# Patient Record
Sex: FEMALE
[De-identification: names, ages, dates, MRNs, and addresses within clinical notes are randomized per-mention and may not be internally consistent; named-entity substitution may affect disease eponyms.]

---

## 2023-10-13 ENCOUNTER — HOSPITAL ENCOUNTER (OUTPATIENT)
Facility: HOSPITAL | Age: 58
Setting detail: RECURRING SERIES
End: 2023-10-13
Payer: MEDICAID

## 2023-10-16 ENCOUNTER — HOSPITAL ENCOUNTER (OUTPATIENT)
Facility: HOSPITAL | Age: 58
Setting detail: RECURRING SERIES
Discharge: HOME | End: 2023-10-19
Payer: MEDICAID

## 2023-10-16 PROCEDURE — 97161 PT EVAL LOW COMPLEX 20 MIN: CPT

## 2023-10-16 NOTE — PROGRESS NOTES
PT DAILY TREATMENT NOTE/KNEE EVAL       Patient Name: Mckayla Hein    Date: 10/16/2023    : 1965  Insurance: Payor: Gregg Thomas / Plan: Margret Paling PLUS / Product Type: *No Product type* /      Patient  verified yes     Visit #   Current / Total 1 12   Time   In / Out 8:05 8:40   Pain   In / Out 8-9/10 8-9/10   Subjective Functional Status/Changes: See below     Treatment Area: Left knee pain [M25.562]    SUBJECTIVE  Pain Level (0-10 scale):  8-9/10  []constant []intermittent []improving []worsening []no change since onset    Any medication changes, allergies to medications, adverse drug reactions, diagnosis change, or new procedure performed?: [x] No    [] Yes (see summary sheet for update)  Subjective functional status/changes:       Mechanism of Injury: pt. Reports initially having a lot of pain. She reports her biggest concern now is her knee feels like it is going to give out randomly. Reports it feels like its coming from the back of her leg when it gives out. She reports numbness in her left big toe. 23. Reports she slipped on food at grocery store. Reports she landed on her left knee then fell to the right side. Had to have an ambulance come get her. X-rays were negative for fracture. Not as painful as it was. Reports having a lot of inflammation and warmth initially and swelling has improved some. Work Hx: works a  but is off of work right now. Painful to stand and walk. Uses cane at home and community   Pt Goals: to get back to normal.     OBJECTIVE/EXAMINATION      30 min [x]Eval  - untimed                   Therapeutic Procedures: Tx Min Billable or 1:1 Min (if diff from Tx Min) Procedure, Rationale, Specifics   5  65570 Therapeutic Exercise (timed):  increase ROM, strength, coordination, balance, and proprioception to improve patient's ability to progress to PLOF and address remaining functional goals.  (see flow sheet as applicable)     Details if

## 2023-10-16 NOTE — THERAPY EVALUATION
Complexity:  History:  MEDIUM  Complexity : 1-2 comorbidities / personal factors will impact the outcome/ POC ; Examination:  MEDIUM Complexity : 3 Standardized tests and measures addressin body structure, function, activity limitation and / or participation in recreation  ;Presentation:  LOW Complexity : Stable, uncomplicated  ;Clinical Decision Making:  MEDIUM Complexity : FOTO score of 26-74 FOTO score = an established functional score where 100 = no disability  Overall Complexity Rating: LOW   Problem List: pain affecting function, decrease ROM, decrease strength, edema affection function, impaired gait/balance, decrease ADL/functional abilities, decrease activity tolerance, decrease flexibility/joint mobility, and decrease transfer abilities    Treatment Plan may include any combination of the followin Therapeutic Exercise, 84913 Neuromuscular Re-Education, 84052 Manual Therapy, 94025 Therapeutic Activity, 92830 Self Care/Home Management, 46046 Electrical Stim unattended, 68990 Electrical Stim attended, 70758 Gait Training, and 40582 Ultrasound  Patient / Family readiness to learn indicated by: asking questions  Persons(s) to be included in education: patient (P)  Barriers to Learning/Limitations: none  Measures taken if barriers to learning present: none  Patient Goal (s): to have less pain and get back to normal   Patient Self Reported Health Status: good  Rehabilitation Potential: good    Short Term Goals: To be accomplished in 1 weeks  Goal: Patient will demonstrate compliance with HEP in order to improve left knee AROM for increased ease of ADLs   Status at evaluation: n/a    Long Term Goals: To be accomplished in 6 weeks    Goal: Patient will improve FOTO score by 23 points in order to demonstrate a significant improvement in function. Status at evaluation/last progress note: 36 points    2.    Goal: Patient will improve left knee AROM flexion to 120 degrees in order to increase ease of

## 2023-11-08 ENCOUNTER — HOSPITAL ENCOUNTER (OUTPATIENT)
Facility: HOSPITAL | Age: 58
Setting detail: RECURRING SERIES
Discharge: HOME | End: 2023-11-11
Payer: MEDICAID

## 2023-11-08 PROCEDURE — 97112 NEUROMUSCULAR REEDUCATION: CPT

## 2023-11-08 PROCEDURE — 97110 THERAPEUTIC EXERCISES: CPT

## 2023-11-08 PROCEDURE — 97530 THERAPEUTIC ACTIVITIES: CPT
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
## 2023-11-10 ENCOUNTER — HOSPITAL ENCOUNTER (OUTPATIENT)
Facility: HOSPITAL | Age: 58
Setting detail: RECURRING SERIES
Discharge: HOME | End: 2023-11-13
Payer: MEDICAID

## 2023-11-10 PROCEDURE — 97112 NEUROMUSCULAR REEDUCATION: CPT

## 2023-11-10 PROCEDURE — 97110 THERAPEUTIC EXERCISES: CPT

## 2023-11-10 PROCEDURE — 97530 THERAPEUTIC ACTIVITIES: CPT

## 2023-11-10 NOTE — PROGRESS NOTES
PHYSICAL / OCCUPATIONAL THERAPY - DAILY TREATMENT NOTE (updated )    Patient Name: Leann Amin    Date: 11/10/2023    : 1965  Insurance: Payor: Anup Sox / Plan: Enrico Williamson PLUS / Product Type: *No Product type* /      Patient  verified Yes     Visit #   Current / Total 3 12   Time   In / Out 9:24 10:10   Pain   In / Out 7/10 8/10   Subjective Functional Status/Changes: Pt. Reports she is doing a little better today. She reports she did well with PT last time. She reports still not complaint with her HEP because of pain. She reports having an MRI done but doesn't have results yet. TREATMENT AREA =  Left knee pain [M25.562]    OBJECTIVE    Modalities Rationale:     decrease pain and increase tissue extensibility to improve patient's ability to progress to PLOF and address remaining functional goals. min [] Estim Unattended, type/location:                                      []  w/ice    []  w/heat    min [] Estim Attended, type/location:                                     []  w/US     []  w/ice    []  w/heat    []  TENS insruct      min []  Mechanical Traction: type/lbs                   []  pro   []  sup   []  int   []  cont    []  before manual    []  after manual    min []  Ultrasound, settings/location:     10 min  unbill []  Ice     [x]  Heat    location/position: Supine with wedge  Left knee    min []  Paraffin,  details:     min []  Vasopneumatic Device, press/temp:     min []  Gwyndolyn [place] / Anselmo Beau: If using vaso (only need to measure limb vaso being performed on)      pre-treatment girth :       post-treatment girth :       measured at (landmark location) :      min []  Other:    Skin assessment post-treatment:   Intact      Therapeutic Procedures:     Tx Min Billable or 1:1 Min (if diff from Tx Min) Procedure, Rationale, Specifics   20  35658 Therapeutic Exercise (timed):  increase ROM, strength, coordination, balance, and proprioception to improve

## 2023-11-14 ENCOUNTER — HOSPITAL ENCOUNTER (OUTPATIENT)
Facility: HOSPITAL | Age: 58
Setting detail: RECURRING SERIES
End: 2023-11-14
Payer: MEDICAID

## 2023-11-17 ENCOUNTER — HOSPITAL ENCOUNTER (OUTPATIENT)
Facility: HOSPITAL | Age: 58
Setting detail: RECURRING SERIES
Discharge: HOME | End: 2023-11-20
Payer: MEDICAID

## 2023-11-17 PROCEDURE — 97112 NEUROMUSCULAR REEDUCATION: CPT

## 2023-11-17 PROCEDURE — 97110 THERAPEUTIC EXERCISES: CPT

## 2023-11-17 PROCEDURE — 97530 THERAPEUTIC ACTIVITIES: CPT

## 2023-11-17 NOTE — THERAPY RECERTIFICATION
[id number]  [address] [place] [address], [place] Lu[place] - Ph: [phone]   Fx: [phone]  PHYSICAL THERAPY PROGRESS NOTE  [x] Progress Note  [] Discharge Summary    Patient Name: Arlene Salcedo : 1965   Treatment/Medical Diagnosis: Left knee pain [M25.562]   Referral Source: Bebo Mendieta*     Date of Initial Visit: 10/16/23 Attended Visits: 4 Missed Visits: 1       SUMMARY OF TREATMENT  Patient is making slow steady progress toward goals in therapy. Patient reports non-compliance with HEP, but reported that she goes up and down stairs multiple times a day. FOTO score decreased 3 points which indicates a slight decrease in functional ability. AROM in the left knee increased to 0-96 degrees. Patient is no longer ambulating with a cane. Left knee strength improved to 4/5 for flexion and 3+/5 for extension. Static balance is improving and patient is able to maintain balance in SLS on the left for 11 seconds. Patient continues to report moderate pain of 6/10. Patient will continue to benefit from skilled PT / OT services to modify and progress therapeutic interventions, analyze and address functional mobility deficits, analyze and address ROM deficits, analyze and address strength deficits, analyze and address soft tissue restrictions, analyze and cue for proper movement patterns, analyze and modify for postural abnormalities, analyze and address imbalance/dizziness, and instruct in home and community integration to address functional deficits and attain remaining goals. CURRENT STATUS/Progress towards Goals:  Short Term Goals: To be accomplished in 1 weeks  Goal: Patient will demonstrate compliance with HEP in order to improve left knee AROM for increased ease of ADLs   Status at evaluation: non-compliant      Long Term Goals:  To be accomplished in 6 weeks     Goal: Patient will improve FOTO score by 23 points in order to

## 2023-11-17 NOTE — PROGRESS NOTES
PHYSICAL / OCCUPATIONAL THERAPY - DAILY TREATMENT NOTE (updated )    Patient Name: Luis M Christianson    Date: 2023    : 1965  Insurance: Payor: Fareed Jimenez / Plan: MadeiraMadeirayeAmbature Nims PLUS / Product Type: *No Product type* /      Patient  verified Yes     Visit #   Current / Total 4 12   Time   In / Out 1201 1239   Pain   In / Out 6/10 5/10   Subjective Functional Status/Changes: Pt stated that she is getting better     TREATMENT AREA =  Left knee pain [M25.562]    OBJECTIVE    Therapeutic Procedures: Tx Min Billable or 1:1 Min (if diff from Tx Min) Procedure, Rationale, Specifics   10  86905 Therapeutic Exercise (timed):  increase ROM, strength, coordination, balance, and proprioception to improve patient's ability to progress to PLOF and address remaining functional goals. (see flow sheet as applicable)     Details if applicable:       10  70449 Neuromuscular Re-Education (timed):  improve balance, coordination, kinesthetic sense, posture, core stability and proprioception to improve patient's ability to develop conscious control of individual muscles and awareness of position of extremities in order to progress to PLOF and address remaining functional goals. (see flow sheet as applicable)     Details if applicable:     18  00294 Therapeutic Activity (timed):  use of dynamic activities replicating functional movements to increase ROM, strength, coordination, balance, and proprioception in order to improve patient's ability to progress to PLOF and address remaining functional goals.   (see flow sheet as applicable)     Details if applicable: FOTO and goal assess    45  [facility] Totals Reminder: bill using total billable min of TIMED therapeutic procedures (example: do not include dry needle or estim unattended, both untimed codes, in totals to left)  8-22 min = 1 unit; 23-37 min = 2 units; 38-52 min = 3 units; 53-67 min = 4 units; 68-82 min = 5 units   Total Total     [x]  Patient

## 2023-11-21 ENCOUNTER — HOSPITAL ENCOUNTER (OUTPATIENT)
Facility: HOSPITAL | Age: 58
Setting detail: RECURRING SERIES
Discharge: HOME | End: 2023-11-24
Payer: MEDICAID

## 2023-11-21 ENCOUNTER — TRANSCRIBE ORDERS (OUTPATIENT)
Facility: HOSPITAL | Age: 58
End: 2023-11-21

## 2023-11-21 DIAGNOSIS — Z12.31 SCREENING MAMMOGRAM FOR HIGH-RISK PATIENT: Primary | ICD-10-CM

## 2023-11-21 PROCEDURE — 97530 THERAPEUTIC ACTIVITIES: CPT

## 2023-11-21 PROCEDURE — 97110 THERAPEUTIC EXERCISES: CPT

## 2023-11-21 PROCEDURE — 97112 NEUROMUSCULAR REEDUCATION: CPT

## 2023-11-21 NOTE — PROGRESS NOTES
[x]  Patient Education billed concurrently with other procedures   [x] Review HEP    [] Progressed/Changed HEP, detail:    [] Other detail:       Objective Information/Functional Measures/Assessment  No difficulty with exercises  Reported feeling the toe raises in the front and back of the knee  Cont with difficulty perform SLS  No LOB with foam Romb    Pt is making slow progress toward goals. Pt cont with moderate pain in the left knee. Cont with decreased standing and walking tolerance. Cont with decreased strength and AROM in the knee. Cont to report having difficulty with performing ADLs and household tasks. Static balance is slowly improving. Patient will continue to benefit from skilled PT / OT services to modify and progress therapeutic interventions, analyze and address functional mobility deficits, analyze and address ROM deficits, analyze and address strength deficits, analyze and cue for proper movement patterns, analyze and modify for postural abnormalities, analyze and address imbalance/dizziness, and instruct in home and community integration to address functional deficits and attain remaining goals. Progress toward goals / Updated goals:  [x]  See Progress Note/Recertification    Short Term Goals: To be accomplished in 1 weeks  Goal: Patient will demonstrate compliance with HEP in order to improve left knee AROM for increased ease of ADLs   Status at evaluation: non-compliant      Long Term Goals: To be accomplished in 6 weeks     Goal: Patient will improve FOTO score by 23 points in order to demonstrate a significant improvement in function. Status at evaluation/last progress note: 33 points     2. Goal: Patient will improve left knee AROM flexion to 120 degrees in order to increase ease of ambulation. Status at evaluation/last progress note: 96 degrees     3. Goal: Patient will improve left knee ext/flex MMT to 4+/5 in order to increase ease of stair negotiation.    Status at

## 2023-11-22 ENCOUNTER — APPOINTMENT (OUTPATIENT)
Facility: HOSPITAL | Age: 58
End: 2023-11-22
Payer: MEDICAID

## 2023-11-27 ENCOUNTER — HOSPITAL ENCOUNTER (OUTPATIENT)
Facility: HOSPITAL | Age: 58
Setting detail: RECURRING SERIES
Discharge: HOME | End: 2023-11-30
Payer: MEDICAID

## 2023-11-27 PROCEDURE — 97530 THERAPEUTIC ACTIVITIES: CPT

## 2023-11-27 PROCEDURE — 97110 THERAPEUTIC EXERCISES: CPT

## 2023-11-27 PROCEDURE — 97112 NEUROMUSCULAR REEDUCATION: CPT

## 2023-11-27 NOTE — PROGRESS NOTES
PHYSICAL / OCCUPATIONAL THERAPY - DAILY TREATMENT NOTE (updated )    Patient Name: Priya Mahmood    Date: 2023    : 1965  Insurance: Payor: Davin Strange / Plan: Crescencio Dunia PLUS / Product Type: *No Product type* /      Patient  verified Yes     Visit #   Current / Total 2 12   Time   In / Out 1040 1117   Pain   In / Out 5/10 010   Subjective Functional Status/Changes: Pt stated that she still has pain, but has been wearing the brace less at home     [address] =  Left knee pain [M25.562]    OBJECTIVE         Therapeutic Procedures: Tx Min Billable or 1:1 Min (if diff from Tx Min) Procedure, Rationale, Specifics   17  45666 Therapeutic Exercise (timed):  increase ROM, strength, coordination, balance, and proprioception to improve patient's ability to progress to PLOF and address remaining functional goals. (see flow sheet as applicable)     Details if applicable:       10  85052 Neuromuscular Re-Education (timed):  improve balance, coordination, kinesthetic sense, posture, core stability and proprioception to improve patient's ability to develop conscious control of individual muscles and awareness of position of extremities in order to progress to PLOF and address remaining functional goals. (see flow sheet as applicable)     Details if applicable:     10  39875 Therapeutic Activity (timed):  use of dynamic activities replicating functional movements to increase ROM, strength, coordination, balance, and proprioception in order to improve patient's ability to progress to PLOF and address remaining functional goals.   (see flow sheet as applicable)     Details if applicable:     40  [facility] Totals Reminder: bill using total billable min of TIMED therapeutic procedures (example: do not include dry needle or estim unattended, both untimed codes, in totals to left)  8-22 min = 1 unit; 23-37 min = 2 units; 38-52 min = 3 units; 53-67 min = 4 units; 68-82 min = 5 units

## 2023-11-29 ENCOUNTER — HOSPITAL ENCOUNTER (OUTPATIENT)
Facility: HOSPITAL | Age: 58
Setting detail: RECURRING SERIES
Discharge: HOME | End: 2023-12-02
Payer: MEDICAID

## 2023-11-29 ENCOUNTER — APPOINTMENT (OUTPATIENT)
Facility: HOSPITAL | Age: 58
End: 2023-11-29
Payer: MEDICAID

## 2023-11-29 PROCEDURE — 97530 THERAPEUTIC ACTIVITIES: CPT

## 2023-11-29 PROCEDURE — 97112 NEUROMUSCULAR REEDUCATION: CPT

## 2023-11-29 PROCEDURE — 97110 THERAPEUTIC EXERCISES: CPT

## 2023-11-29 NOTE — PROGRESS NOTES
PHYSICAL / OCCUPATIONAL THERAPY - DAILY TREATMENT NOTE (updated )    Patient Name: Kaya Mosley    Date: 2023    : 1965  Insurance: Payor: Arlene Rendon / Plan: Quyen Avalos PLUS / Product Type: *No Product type* /      Patient  verified Yes     Visit #   Current / Total 3 12   Time   In / Out 8:08 8:39   Pain   In / Out 5/10 4-5/10   Subjective Functional Status/Changes: Pt reports doing her exercises at home and getting better. She notes tingling occasional in the front of her knee occasionally. Feels the back of the leg is getting stronger. TREATMENT AREA =  Left knee pain [M25.562]    OBJECTIVE         Therapeutic Procedures: Tx Min Billable or 1:1 Min (if diff from Tx Min) Procedure, Rationale, Specifics   13  20215 Therapeutic Exercise (timed):  increase ROM, strength, coordination, balance, and proprioception to improve patient's ability to progress to PLOF and address remaining functional goals. (see flow sheet as applicable)     Details if applicable:  see flow sheet     8  32281 Neuromuscular Re-Education (timed):  improve balance, coordination, kinesthetic sense, posture, core stability and proprioception to improve patient's ability to develop conscious control of individual muscles and awareness of position of extremities in order to progress to PLOF and address remaining functional goals. (see flow sheet as applicable)     Details if applicable:  see flow sheet   10  62140 Therapeutic Activity (timed):  use of dynamic activities replicating functional movements to increase ROM, strength, coordination, balance, and proprioception in order to improve patient's ability to progress to PLOF and address remaining functional goals.   (see flow sheet as applicable)     Details if applicable:  see flow sheet; sit to stands; home exercise progression   31  [facility] Totals Reminder: bill using total billable min of TIMED therapeutic procedures (example: do not

## 2023-12-04 ENCOUNTER — HOSPITAL ENCOUNTER (OUTPATIENT)
Facility: HOSPITAL | Age: 58
Setting detail: RECURRING SERIES
Discharge: HOME | End: 2023-12-07
Payer: MEDICAID

## 2023-12-04 PROCEDURE — 97112 NEUROMUSCULAR REEDUCATION: CPT

## 2023-12-04 PROCEDURE — 97530 THERAPEUTIC ACTIVITIES: CPT

## 2023-12-04 PROCEDURE — 97110 THERAPEUTIC EXERCISES: CPT
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
## 2023-12-11 ENCOUNTER — HOSPITAL ENCOUNTER (OUTPATIENT)
Facility: HOSPITAL | Age: 58
Setting detail: RECURRING SERIES
Discharge: HOME | End: 2023-12-14
Payer: MEDICAID

## 2023-12-11 PROCEDURE — 97112 NEUROMUSCULAR REEDUCATION: CPT

## 2023-12-11 PROCEDURE — 97110 THERAPEUTIC EXERCISES: CPT

## 2023-12-11 PROCEDURE — 97530 THERAPEUTIC ACTIVITIES: CPT

## 2023-12-11 NOTE — PROGRESS NOTES
Patient Education billed concurrently with other procedures   [x] Review HEP    [] Progressed/Changed HEP, detail:    [] Other detail:       Objective Information/Functional Measures/Assessment  Had slight increase in pain with hip x 3  No LOB with Romberg foam  Will increased to 50# on the leg press next visit    Pt is making good progress toward goals. Cont with moderate pain with activity. Strength and AROM are improving. Cont with decreased standing and walking tolerance. Patient will continue to benefit from skilled PT / OT services to modify and progress therapeutic interventions, analyze and address functional mobility deficits, analyze and address ROM deficits, analyze and address strength deficits, analyze and cue for proper movement patterns, analyze and modify for postural abnormalities, analyze and address imbalance/dizziness, and instruct in home and community integration to address functional deficits and attain remaining goals. Progress toward goals / Updated goals:  [x]  See Progress Note/Recertification    Short Term Goals: To be accomplished in 1 weeks  Goal: Patient will demonstrate compliance with HEP in order to improve left knee AROM for increased ease of ADLs   Status at evaluation: non-compliant      Long Term Goals: To be accomplished in 6 weeks     Goal: Patient will improve FOTO score by 23 points in order to demonstrate a significant improvement in function. Status at evaluation/last progress note: 33 points     2. Goal: Patient will improve left knee AROM flexion to 120 degrees in order to increase ease of ambulation. Status at evaluation/last progress note: 96 degrees   degrees (11/29/23)     3. Goal: Patient will improve left knee ext/flex MMT to 4+/5 in order to increase ease of stair negotiation. Status at evaluation/last progress note: flex: 4/5 ext: 3+/5      4.    Goal: Patient will improve left single leg balance to 30 seconds in order to increase ease of

## 2023-12-15 ENCOUNTER — TELEPHONE (OUTPATIENT)
Facility: HOSPITAL | Age: 58
End: 2023-12-15

## 2023-12-15 NOTE — TELEPHONE ENCOUNTER
called about missed appointment. voicemail was fulll and unable to leave message. will D/C if she misses another appointment.

## 2023-12-18 ENCOUNTER — APPOINTMENT (OUTPATIENT)
Facility: HOSPITAL | Age: 58
End: 2023-12-18
Payer: MEDICAID